# Patient Record
Sex: FEMALE | Race: OTHER | HISPANIC OR LATINO | URBAN - METROPOLITAN AREA
[De-identification: names, ages, dates, MRNs, and addresses within clinical notes are randomized per-mention and may not be internally consistent; named-entity substitution may affect disease eponyms.]

---

## 2017-02-13 ENCOUNTER — EMERGENCY (EMERGENCY)
Facility: HOSPITAL | Age: 27
LOS: 1 days | Discharge: HOME | End: 2017-02-13
Attending: EMERGENCY MEDICINE | Admitting: EMERGENCY MEDICINE
Payer: COMMERCIAL

## 2017-02-13 VITALS
TEMPERATURE: 98 F | WEIGHT: 154.98 LBS | OXYGEN SATURATION: 98 % | HEART RATE: 85 BPM | SYSTOLIC BLOOD PRESSURE: 114 MMHG | RESPIRATION RATE: 16 BRPM | DIASTOLIC BLOOD PRESSURE: 71 MMHG

## 2017-02-13 DIAGNOSIS — S76.912A STRAIN OF UNSPECIFIED MUSCLES, FASCIA AND TENDONS AT THIGH LEVEL, LEFT THIGH, INITIAL ENCOUNTER: ICD-10-CM
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
## 2017-02-13 DIAGNOSIS — M25.511 PAIN IN RIGHT SHOULDER: ICD-10-CM
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
## 2017-02-13 DIAGNOSIS — S46.911A STRAIN OF UNSPECIFIED MUSCLE, FASCIA AND TENDON AT SHOULDER AND UPPER ARM LEVEL, RIGHT ARM, INITIAL ENCOUNTER: ICD-10-CM

## 2017-02-13 PROCEDURE — 99284 EMERGENCY DEPT VISIT MOD MDM: CPT

## 2017-02-13 PROCEDURE — 73030 X-RAY EXAM OF SHOULDER: CPT

## 2017-02-13 PROCEDURE — 73030 X-RAY EXAM OF SHOULDER: CPT | Mod: 26,RT

## 2017-02-13 PROCEDURE — 99283 EMERGENCY DEPT VISIT LOW MDM: CPT | Mod: 25

## 2017-02-13 RX ORDER — IBUPROFEN 200 MG
600 TABLET ORAL ONCE
Qty: 0 | Refills: 0 | Status: COMPLETED | OUTPATIENT
Start: 2017-02-13 | End: 2017-02-13

## 2017-02-13 RX ADMIN — Medication 600 MILLIGRAM(S): at 11:30

## 2017-02-13 RX ADMIN — Medication 600 MILLIGRAM(S): at 11:01

## 2017-02-13 NOTE — ED ADULT NURSE NOTE - OBJECTIVE STATEMENT
Pt c/o R shoulder pain status post fall. Denies injuries to the head. Full ROM in the right shoulder pain increase while moving. Cap refill 1 second, radial pulses intact. No deformity or injury noted.

## 2017-02-13 NOTE — ED PROVIDER NOTE - OBJECTIVE STATEMENT
Patient is a 26 year old female with no significant PMH who presents to ED c/o pain to the back of her right shoulder and pain to the outside of her left thigh s/p slip Patient is a 26 year old female with no significant PMH who presents to ED c/o pain to the back of her right shoulder and pain to the outside of her left thigh s/p slip and fall on ice this morning. Pt reports that she slipped, causing her to strike her right shoulder into a metal gate and then strained her left thigh as her weight shifted to the right side. Denies trauma to left leg. Denies dizziness prior to fall, neck pain, headache, head trauma, loc, other injury.

## 2017-02-13 NOTE — ED ADULT TRIAGE NOTE - CHIEF COMPLAINT QUOTE
Patient states she slipped on ice while walking and landed on a gate. C/O right shoulder pain and LLE pain. Denies head injury or LOC.

## 2017-02-13 NOTE — ED PROVIDER NOTE - MUSCULOSKELETAL, MLM
Spine appears normal, range of motion is not limited. No midline tenderness. RUE: no swelling, deformity or ecchymosis. (+) tenderness of posterior Spine appears normal, range of motion is not limited. No midline tenderness. RUE: no swelling, deformity or ecchymosis. (+) tenderness of posterior aspect of right shoulder/trapzius. FROM at shoulder, elbow and wrist. LLE: Mild tenderness along lateral aspect of left thigh, no swelling, ecchymosis or deformity, FROM at knee and hip. No bony ttp. Good pulses, normal strength and sensation.

## 2017-02-13 NOTE — ED PROVIDER NOTE - MEDICAL DECISION MAKING DETAILS
26 year old female with right shoulder and left leg pain s/p slip and fall today. No loc. neurologically intact. Hcg negative. Given motrin for pain. Xray of right shoulder negative for fx. Pt is ambulatory. Will d/c home.

## 2017-05-25 ENCOUNTER — EMERGENCY (EMERGENCY)
Facility: HOSPITAL | Age: 27
LOS: 1 days | Discharge: HOME | End: 2017-05-25
Attending: EMERGENCY MEDICINE | Admitting: EMERGENCY MEDICINE
Payer: COMMERCIAL

## 2017-05-25 VITALS
RESPIRATION RATE: 16 BRPM | DIASTOLIC BLOOD PRESSURE: 87 MMHG | HEART RATE: 76 BPM | TEMPERATURE: 99 F | SYSTOLIC BLOOD PRESSURE: 133 MMHG | OXYGEN SATURATION: 99 %

## 2017-05-25 VITALS
OXYGEN SATURATION: 98 % | HEART RATE: 90 BPM | TEMPERATURE: 99 F | DIASTOLIC BLOOD PRESSURE: 75 MMHG | WEIGHT: 156.75 LBS | SYSTOLIC BLOOD PRESSURE: 114 MMHG | RESPIRATION RATE: 16 BRPM

## 2017-05-25 DIAGNOSIS — R11.0 NAUSEA: ICD-10-CM

## 2017-05-25 DIAGNOSIS — R51 HEADACHE: ICD-10-CM

## 2017-05-25 DIAGNOSIS — H53.149 VISUAL DISCOMFORT, UNSPECIFIED: ICD-10-CM

## 2017-05-25 PROCEDURE — 96374 THER/PROPH/DIAG INJ IV PUSH: CPT

## 2017-05-25 PROCEDURE — 99284 EMERGENCY DEPT VISIT MOD MDM: CPT | Mod: 25

## 2017-05-25 PROCEDURE — 99284 EMERGENCY DEPT VISIT MOD MDM: CPT

## 2017-05-25 RX ORDER — SODIUM CHLORIDE 9 MG/ML
1000 INJECTION INTRAMUSCULAR; INTRAVENOUS; SUBCUTANEOUS ONCE
Qty: 0 | Refills: 0 | Status: COMPLETED | OUTPATIENT
Start: 2017-05-25 | End: 2017-05-25

## 2017-05-25 RX ORDER — KETOROLAC TROMETHAMINE 30 MG/ML
30 SYRINGE (ML) INJECTION ONCE
Qty: 0 | Refills: 0 | Status: DISCONTINUED | OUTPATIENT
Start: 2017-05-25 | End: 2017-05-25

## 2017-05-25 RX ADMIN — SODIUM CHLORIDE 2000 MILLILITER(S): 9 INJECTION INTRAMUSCULAR; INTRAVENOUS; SUBCUTANEOUS at 17:45

## 2017-05-25 RX ADMIN — Medication 30 MILLIGRAM(S): at 18:20

## 2017-05-25 RX ADMIN — Medication 30 MILLIGRAM(S): at 17:50

## 2017-05-25 NOTE — ED PROVIDER NOTE - MEDICAL DECISION MAKING DETAILS
headache. pt well appearing. VSS. no evidence of infection neuro exam intact. toradol and iv fluid given with relief of ha. f/u with neuro

## 2017-05-25 NOTE — ED PROVIDER NOTE - ATTENDING CONTRIBUTION TO CARE
hx of headache presenting with headache, gradual in onset, no neck pain, fever. similar to prior headaches in past. agree w PA exam as above. neuro exam wnl. given fluids, toradol with resolution, to fu w pmd/neuro.

## 2017-05-25 NOTE — ED PROVIDER NOTE - OBJECTIVE STATEMENT
25 y/o female with hx of ha's c/o ha x 4 days. pt reports pain to occipital region. no fever or chills. + nausea. no vomiting. no visual changes. + photophobia. no numbness, tingling or weakness. pt reports similar ha's in past. no further complaints
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
## 2018-02-24 ENCOUNTER — EMERGENCY (EMERGENCY)
Facility: HOSPITAL | Age: 28
LOS: 1 days | Discharge: HOME | End: 2018-02-24
Admitting: EMERGENCY MEDICINE
Payer: COMMERCIAL

## 2018-02-24 VITALS
RESPIRATION RATE: 20 BRPM | DIASTOLIC BLOOD PRESSURE: 87 MMHG | SYSTOLIC BLOOD PRESSURE: 128 MMHG | OXYGEN SATURATION: 100 % | WEIGHT: 157.85 LBS | TEMPERATURE: 98 F | HEART RATE: 70 BPM

## 2018-02-24 DIAGNOSIS — M54.5 LOW BACK PAIN: ICD-10-CM

## 2018-02-24 DIAGNOSIS — M79.1 MYALGIA: ICD-10-CM

## 2018-02-24 PROCEDURE — 99285 EMERGENCY DEPT VISIT HI MDM: CPT | Mod: 25

## 2018-02-24 RX ORDER — KETOROLAC TROMETHAMINE 30 MG/ML
30 SYRINGE (ML) INJECTION ONCE
Qty: 0 | Refills: 0 | Status: DISCONTINUED | OUTPATIENT
Start: 2018-02-24 | End: 2018-02-24

## 2018-02-24 NOTE — ED ADULT NURSE NOTE - OBJECTIVE STATEMENT
pt received in room 4 A & O x 3 pt c/o lower back pain since November after attending a boxing class , pt denies any urinary or bowel disfunction will continue to monitor

## 2018-02-25 VITALS
RESPIRATION RATE: 16 BRPM | SYSTOLIC BLOOD PRESSURE: 118 MMHG | TEMPERATURE: 98 F | HEART RATE: 80 BPM | DIASTOLIC BLOOD PRESSURE: 79 MMHG | OXYGEN SATURATION: 100 %

## 2018-02-25 LAB
ALBUMIN SERPL ELPH-MCNC: 4.1 G/DL — SIGNIFICANT CHANGE UP (ref 3.3–5)
ALP SERPL-CCNC: 55 U/L — SIGNIFICANT CHANGE UP (ref 40–120)
ALT FLD-CCNC: 42 U/L — SIGNIFICANT CHANGE UP (ref 10–45)
ANION GAP SERPL CALC-SCNC: 10 MMOL/L — SIGNIFICANT CHANGE UP (ref 5–17)
AST SERPL-CCNC: 16 U/L — SIGNIFICANT CHANGE UP (ref 10–40)
BASOPHILS NFR BLD AUTO: 0.2 % — SIGNIFICANT CHANGE UP (ref 0–2)
BILIRUB SERPL-MCNC: 1.2 MG/DL — SIGNIFICANT CHANGE UP (ref 0.2–1.2)
BUN SERPL-MCNC: 14 MG/DL — SIGNIFICANT CHANGE UP (ref 7–23)
CALCIUM SERPL-MCNC: 8.9 MG/DL — SIGNIFICANT CHANGE UP (ref 8.4–10.5)
CHLORIDE SERPL-SCNC: 102 MMOL/L — SIGNIFICANT CHANGE UP (ref 96–108)
CO2 SERPL-SCNC: 24 MMOL/L — SIGNIFICANT CHANGE UP (ref 22–31)
CREAT SERPL-MCNC: 0.63 MG/DL — SIGNIFICANT CHANGE UP (ref 0.5–1.3)
EOSINOPHIL NFR BLD AUTO: 1.3 % — SIGNIFICANT CHANGE UP (ref 0–6)
GLUCOSE SERPL-MCNC: 91 MG/DL — SIGNIFICANT CHANGE UP (ref 70–99)
HCT VFR BLD CALC: 37.9 % — SIGNIFICANT CHANGE UP (ref 34.5–45)
HGB BLD-MCNC: 12.2 G/DL — SIGNIFICANT CHANGE UP (ref 11.5–15.5)
LYMPHOCYTES # BLD AUTO: 28.5 % — SIGNIFICANT CHANGE UP (ref 13–44)
MCHC RBC-ENTMCNC: 28.6 PG — SIGNIFICANT CHANGE UP (ref 27–34)
MCHC RBC-ENTMCNC: 32.2 G/DL — SIGNIFICANT CHANGE UP (ref 32–36)
MCV RBC AUTO: 88.8 FL — SIGNIFICANT CHANGE UP (ref 80–100)
MONOCYTES NFR BLD AUTO: 8.5 % — SIGNIFICANT CHANGE UP (ref 2–14)
NEUTROPHILS NFR BLD AUTO: 61.5 % — SIGNIFICANT CHANGE UP (ref 43–77)
PLATELET # BLD AUTO: 255 K/UL — SIGNIFICANT CHANGE UP (ref 150–400)
POTASSIUM SERPL-MCNC: 3.6 MMOL/L — SIGNIFICANT CHANGE UP (ref 3.5–5.3)
POTASSIUM SERPL-SCNC: 3.6 MMOL/L — SIGNIFICANT CHANGE UP (ref 3.5–5.3)
PROT SERPL-MCNC: 7.1 G/DL — SIGNIFICANT CHANGE UP (ref 6–8.3)
RBC # BLD: 4.27 M/UL — SIGNIFICANT CHANGE UP (ref 3.8–5.2)
RBC # FLD: 13.6 % — SIGNIFICANT CHANGE UP (ref 10.3–16.9)
SODIUM SERPL-SCNC: 136 MMOL/L — SIGNIFICANT CHANGE UP (ref 135–145)
WBC # BLD: 9.5 K/UL — SIGNIFICANT CHANGE UP (ref 3.8–10.5)
WBC # FLD AUTO: 9.5 K/UL — SIGNIFICANT CHANGE UP (ref 3.8–10.5)

## 2018-02-25 PROCEDURE — 96374 THER/PROPH/DIAG INJ IV PUSH: CPT

## 2018-02-25 PROCEDURE — 72193 CT PELVIS W/DYE: CPT | Mod: 26

## 2018-02-25 PROCEDURE — 36415 COLL VENOUS BLD VENIPUNCTURE: CPT

## 2018-02-25 PROCEDURE — 85025 COMPLETE CBC W/AUTO DIFF WBC: CPT

## 2018-02-25 PROCEDURE — 99284 EMERGENCY DEPT VISIT MOD MDM: CPT | Mod: 25

## 2018-02-25 PROCEDURE — 80053 COMPREHEN METABOLIC PANEL: CPT

## 2018-02-25 PROCEDURE — 72193 CT PELVIS W/DYE: CPT

## 2018-02-25 RX ADMIN — Medication 30 MILLIGRAM(S): at 00:22

## 2018-02-25 NOTE — ED PROVIDER NOTE - MUSCULOSKELETAL, MLM
Spine appears normal, no midline spine tenderness, +tenderness to area of coccyx and proximal gluteal fold, no fluctuance noted

## 2018-02-25 NOTE — ED PROVIDER NOTE - OBJECTIVE STATEMENT
28 y/o f presents stating has been having pain in low back at top of her buttock for the past 3 months.  Pt stating she went to ED initially, had no imaging and has been taking ibuprofen with some improvement.  Pt stating pain has worsened in the past week, having difficulty sitting.  Denies numbness/tingling to ext, weakness, bowel/bladder incontinence, saddle anesthesia, fever, all other ROS negative.

## 2018-02-25 NOTE — ED PROVIDER NOTE - MEDICAL DECISION MAKING DETAILS
26 y/o f pain to buttock area x 3 months; msk etiology vs pilonidal/perirectal abscess.  Neuro exam intact, labs wnl, will get CT, f/u result and reassess.

## 2018-04-27 ENCOUNTER — EMERGENCY (EMERGENCY)
Facility: HOSPITAL | Age: 28
LOS: 1 days | Discharge: HOME | End: 2018-04-27
Attending: EMERGENCY MEDICINE | Admitting: EMERGENCY MEDICINE
Payer: COMMERCIAL

## 2018-04-27 VITALS
TEMPERATURE: 98 F | HEART RATE: 90 BPM | RESPIRATION RATE: 16 BRPM | HEIGHT: 63 IN | DIASTOLIC BLOOD PRESSURE: 86 MMHG | SYSTOLIC BLOOD PRESSURE: 120 MMHG | WEIGHT: 156.09 LBS | OXYGEN SATURATION: 99 %

## 2018-04-27 DIAGNOSIS — R06.02 SHORTNESS OF BREATH: ICD-10-CM

## 2018-04-27 DIAGNOSIS — R05 COUGH: ICD-10-CM

## 2018-04-27 DIAGNOSIS — R53.83 OTHER FATIGUE: ICD-10-CM

## 2018-04-27 DIAGNOSIS — J45.909 UNSPECIFIED ASTHMA, UNCOMPLICATED: ICD-10-CM

## 2018-04-27 DIAGNOSIS — R07.89 OTHER CHEST PAIN: ICD-10-CM

## 2018-04-27 LAB
ALBUMIN SERPL ELPH-MCNC: 4.1 G/DL — SIGNIFICANT CHANGE UP (ref 3.3–5)
ALP SERPL-CCNC: 61 U/L — SIGNIFICANT CHANGE UP (ref 40–120)
ALT FLD-CCNC: 34 U/L — SIGNIFICANT CHANGE UP (ref 10–45)
ANION GAP SERPL CALC-SCNC: 12 MMOL/L — SIGNIFICANT CHANGE UP (ref 5–17)
AST SERPL-CCNC: 22 U/L — SIGNIFICANT CHANGE UP (ref 10–40)
BASOPHILS NFR BLD AUTO: 0.2 % — SIGNIFICANT CHANGE UP (ref 0–2)
BILIRUB SERPL-MCNC: 0.9 MG/DL — SIGNIFICANT CHANGE UP (ref 0.2–1.2)
BUN SERPL-MCNC: 8 MG/DL — SIGNIFICANT CHANGE UP (ref 7–23)
CALCIUM SERPL-MCNC: 9.1 MG/DL — SIGNIFICANT CHANGE UP (ref 8.4–10.5)
CHLORIDE SERPL-SCNC: 105 MMOL/L — SIGNIFICANT CHANGE UP (ref 96–108)
CK MB CFR SERPL CALC: 1.7 NG/ML — SIGNIFICANT CHANGE UP (ref 0–6.7)
CK SERPL-CCNC: 95 U/L — SIGNIFICANT CHANGE UP (ref 25–170)
CO2 SERPL-SCNC: 21 MMOL/L — LOW (ref 22–31)
CREAT SERPL-MCNC: 0.77 MG/DL — SIGNIFICANT CHANGE UP (ref 0.5–1.3)
D DIMER BLD IA.RAPID-MCNC: <150 NG/ML DDU — SIGNIFICANT CHANGE UP
EOSINOPHIL NFR BLD AUTO: 1.3 % — SIGNIFICANT CHANGE UP (ref 0–6)
GLUCOSE SERPL-MCNC: 103 MG/DL — HIGH (ref 70–99)
HCT VFR BLD CALC: 37.6 % — SIGNIFICANT CHANGE UP (ref 34.5–45)
HGB BLD-MCNC: 12.5 G/DL — SIGNIFICANT CHANGE UP (ref 11.5–15.5)
LYMPHOCYTES # BLD AUTO: 27.4 % — SIGNIFICANT CHANGE UP (ref 13–44)
MCHC RBC-ENTMCNC: 29.1 PG — SIGNIFICANT CHANGE UP (ref 27–34)
MCHC RBC-ENTMCNC: 33.2 G/DL — SIGNIFICANT CHANGE UP (ref 32–36)
MCV RBC AUTO: 87.4 FL — SIGNIFICANT CHANGE UP (ref 80–100)
MONOCYTES NFR BLD AUTO: 6.7 % — SIGNIFICANT CHANGE UP (ref 2–14)
NEUTROPHILS NFR BLD AUTO: 64.4 % — SIGNIFICANT CHANGE UP (ref 43–77)
PLATELET # BLD AUTO: 284 K/UL — SIGNIFICANT CHANGE UP (ref 150–400)
POTASSIUM SERPL-MCNC: 3.8 MMOL/L — SIGNIFICANT CHANGE UP (ref 3.5–5.3)
POTASSIUM SERPL-SCNC: 3.8 MMOL/L — SIGNIFICANT CHANGE UP (ref 3.5–5.3)
PROT SERPL-MCNC: 7.1 G/DL — SIGNIFICANT CHANGE UP (ref 6–8.3)
RBC # BLD: 4.3 M/UL — SIGNIFICANT CHANGE UP (ref 3.8–5.2)
RBC # FLD: 13.5 % — SIGNIFICANT CHANGE UP (ref 10.3–16.9)
SODIUM SERPL-SCNC: 138 MMOL/L — SIGNIFICANT CHANGE UP (ref 135–145)
TROPONIN T SERPL-MCNC: <0.01 NG/ML — SIGNIFICANT CHANGE UP (ref 0–0.01)
WBC # BLD: 9.5 K/UL — SIGNIFICANT CHANGE UP (ref 3.8–10.5)
WBC # FLD AUTO: 9.5 K/UL — SIGNIFICANT CHANGE UP (ref 3.8–10.5)

## 2018-04-27 PROCEDURE — 80053 COMPREHEN METABOLIC PANEL: CPT

## 2018-04-27 PROCEDURE — 85379 FIBRIN DEGRADATION QUANT: CPT

## 2018-04-27 PROCEDURE — 99285 EMERGENCY DEPT VISIT HI MDM: CPT | Mod: 25

## 2018-04-27 PROCEDURE — 71046 X-RAY EXAM CHEST 2 VIEWS: CPT

## 2018-04-27 PROCEDURE — 82553 CREATINE MB FRACTION: CPT

## 2018-04-27 PROCEDURE — 36415 COLL VENOUS BLD VENIPUNCTURE: CPT

## 2018-04-27 PROCEDURE — 84484 ASSAY OF TROPONIN QUANT: CPT

## 2018-04-27 PROCEDURE — 71046 X-RAY EXAM CHEST 2 VIEWS: CPT | Mod: 26

## 2018-04-27 PROCEDURE — 84702 CHORIONIC GONADOTROPIN TEST: CPT

## 2018-04-27 PROCEDURE — 99284 EMERGENCY DEPT VISIT MOD MDM: CPT | Mod: 25

## 2018-04-27 PROCEDURE — 96374 THER/PROPH/DIAG INJ IV PUSH: CPT

## 2018-04-27 PROCEDURE — 93005 ELECTROCARDIOGRAM TRACING: CPT

## 2018-04-27 PROCEDURE — 93010 ELECTROCARDIOGRAM REPORT: CPT

## 2018-04-27 PROCEDURE — 85025 COMPLETE CBC W/AUTO DIFF WBC: CPT

## 2018-04-27 PROCEDURE — 82550 ASSAY OF CK (CPK): CPT

## 2018-04-27 RX ORDER — SODIUM CHLORIDE 9 MG/ML
1000 INJECTION INTRAMUSCULAR; INTRAVENOUS; SUBCUTANEOUS ONCE
Qty: 0 | Refills: 0 | Status: COMPLETED | OUTPATIENT
Start: 2018-04-27 | End: 2018-04-27

## 2018-04-27 RX ORDER — KETOROLAC TROMETHAMINE 30 MG/ML
30 SYRINGE (ML) INJECTION ONCE
Qty: 0 | Refills: 0 | Status: DISCONTINUED | OUTPATIENT
Start: 2018-04-27 | End: 2018-04-27

## 2018-04-27 RX ADMIN — SODIUM CHLORIDE 2000 MILLILITER(S): 9 INJECTION INTRAMUSCULAR; INTRAVENOUS; SUBCUTANEOUS at 18:06

## 2018-04-27 RX ADMIN — Medication 30 MILLIGRAM(S): at 18:17

## 2018-04-27 NOTE — ED PROVIDER NOTE - OBJECTIVE STATEMENT
28 yo female, otherwise healthy, childhood asthma, IUD in place X years, p/w left sided chest pain on and off X 1 month which worsens with deep breath and associated with SOB and fatigue. Pt's grandma just had OHS and she is concerned she may have heart problems. No FH of early cardiac ds or death. Pt states that pain has been continuos for a week and it hurts to take deep breaths. Also with some dry cough X few days with no fevers/ chills. Chest pain is sharp and achy and located in left upper chest region with no radiation. No trauma/ injury/N/V/diarrhea or abd pain/ leg pain or swelling. No other complaints. No risk factors for VTE. 26 yo female, otherwise healthy, childhood asthma, IUD in place X years, p/w left sided chest pain on and off X 1 month which worsens with deep breaths and associated with SOB and fatigue. Pt's grandma just had OHS and she is concerned she may have heart problems. No FH of early cardiac ds or death. Pt states that pain has been continuos for a week and it hurts to take deep breaths. Also with some dry cough X few days with no fevers/ chills. Chest pain is sharp and achy and located in left upper chest region with no radiation. No trauma/ injury/N/V/diarrhea or abd pain/ leg pain or swelling. No other complaints. No risk factors for VTE.

## 2018-04-27 NOTE — ED ADULT NURSE NOTE - OBJECTIVE STATEMENT
27 year old female patient with c/o of chest discomfort [id number] worsening with inspiration and exertion.  No distress noted, c/o dry cough x few days, denies phelgm.  A+OX3, ambulatory w steady gait, breathing appears easy & unlabored, speaking in full sentences.

## 2018-08-26 NOTE — ED PROVIDER NOTE - MEDICAL DECISION MAKING DETAILS
Applied 26 yo female, otherwise healthy, childhood asthma, IUD in place X years, p/w left sided chest pain on and off X 1 month which worsens with deep breath and associated with SOB and fatigue. Pt's grandma just had OHS and she is concerned she may have heart problems. No FH of early cardiac ds or death. Pt states that pain has been continuos for a week and it hurts to take deep breaths. Also with some dry cough X few days with no fevers/ chills. Chest pain is sharp and achy and located in left upper chest region with no radiation. No trauma/ injury/N/V/diarrhea or abd pain/ leg pain or swelling. No other complaints. No risk factors for VTE. 28 yo female, otherwise healthy, childhood asthma, IUD in place X years, p/w left sided chest pain on and off X 1 month which worsens with deep breaths and associated with SOB and fatigue. Labs/ studies/ EKG noted and with no acute findings. D-dimer and trop negative. Reassured and to f/up outpt.

## 2021-05-12 NOTE — ED ADULT NURSE NOTE - CHIEF COMPLAINT QUOTE
Caller would like a Return call for medication sitaGLIPtin (Januvia) 50 MG tablet discussion. Writer advised caller of callback within 24-72 hours.    Patient Name: Kunal Sanders Jr  Caller Name: same  Name of Facility: n/a  Callback Number: [phone]  Best Availability: anytime  Can A Detailed Message Be left? yes  Fax Number: n/a  Additional Info: Patient is receiving a 90 day supply but would prefer a monthly supply due to the cost.    Please advise. Writer is unable to assist caller any further.    Did you confirm the message with the caller?: yes    Thank you,  Marilyn Ayoub   pt c/o mid to left sided cp x 1 month, constant over the past 3 days.

## 2022-07-03 NOTE — ED PROVIDER NOTE - NEUROLOGICAL, MLM
[de-identified] : back and leg pain persists and wants to consider epidurals. also discussed hands that were injected and have done wll Alert and oriented, no focal deficits, no motor or sensory deficits. CN II-XI intact b/l
